# Patient Record
Sex: FEMALE
[De-identification: names, ages, dates, MRNs, and addresses within clinical notes are randomized per-mention and may not be internally consistent; named-entity substitution may affect disease eponyms.]

---

## 2022-11-14 PROBLEM — Z00.00 ENCOUNTER FOR PREVENTIVE HEALTH EXAMINATION: Status: ACTIVE | Noted: 2022-11-14

## 2022-11-15 ENCOUNTER — APPOINTMENT (OUTPATIENT)
Dept: ENDOCRINOLOGY | Facility: CLINIC | Age: 27
End: 2022-11-15

## 2022-11-15 VITALS
DIASTOLIC BLOOD PRESSURE: 64 MMHG | HEIGHT: 64 IN | WEIGHT: 160 LBS | BODY MASS INDEX: 27.31 KG/M2 | SYSTOLIC BLOOD PRESSURE: 112 MMHG

## 2022-11-15 DIAGNOSIS — Z78.9 OTHER SPECIFIED HEALTH STATUS: ICD-10-CM

## 2022-11-15 DIAGNOSIS — E04.1 NONTOXIC SINGLE THYROID NODULE: ICD-10-CM

## 2022-11-15 PROCEDURE — 99203 OFFICE O/P NEW LOW 30 MIN: CPT

## 2022-11-15 RX ORDER — DOCUSATE SODIUM 100 MG/1
100 CAPSULE, LIQUID FILLED ORAL
Qty: 60 | Refills: 0 | Status: ACTIVE | COMMUNITY
Start: 2022-06-15

## 2022-11-15 RX ORDER — NEOMYCIN AND POLYMYXIN B SULFATES AND DEXAMETHASONE 3.5; 10000; 1 MG/G; [IU]/G; MG/G
3.5-10000-0.1 OINTMENT OPHTHALMIC
Qty: 4 | Refills: 0 | Status: ACTIVE | COMMUNITY
Start: 2022-11-04

## 2022-11-15 RX ORDER — ASCORBIC ACID 500 MG
500 TABLET ORAL
Qty: 30 | Refills: 0 | Status: ACTIVE | COMMUNITY
Start: 2022-06-15

## 2022-11-15 RX ORDER — CHLORHEXIDINE GLUCONATE 4 %
325 (65 FE) LIQUID (ML) TOPICAL
Qty: 30 | Refills: 0 | Status: ACTIVE | COMMUNITY
Start: 2022-06-15

## 2022-11-15 NOTE — PHYSICAL EXAM
[Alert] : alert [No Acute Distress] : no acute distress [Normal Sclera/Conjunctiva] : normal sclera/conjunctiva [EOMI] : extra ocular movement intact [No Proptosis] : no proptosis [No Lid Lag] : no lid lag

## 2022-12-06 NOTE — ASSESSMENT
[FreeTextEntry1] : Thyroid nodule\par Thyroid sonogram from August 2022, noted 1.3 cm hypoechoic nodule in left lower lobe, does not appear to meet criteria for fine-needle aspiration at this time.\par Recommended to repeat thyroid sonogram in 1 year\par Clinically euthyroid, not on any thyroid medication\par Advised to do blood work for thyroid hormone levels.\par \par Answered all questions today; patient verbalized understanding of the above\par RTC in 6 months\par

## 2022-12-06 NOTE — HISTORY OF PRESENT ILLNESS
[FreeTextEntry1] : JUAN CALDERÓN  is a 27 year old female with past medical history of iron deficiency anemia who presents today for management of thyroid nodule\par \par No known previous thyroid disease, not on medication. She notes she has bloodwork scheduled at Dr Ruvalcaba next week hematologist office.\par Patient feels more cold all the time,  denies dyspnea, dysphagia, dysphonia, denies changes in bowel habits including diarrhea or constipation. In past 1 year, lost weight . Stopped IUD due to feeling depression. She is sexually active, interested in pregnancy. She any recent change in weight.  She denies change in menstrual cycles (cycles are regular, last days, LMP is 10/1/22). \par \par Family Hx: No thyroid disease